# Patient Record
Sex: MALE | NOT HISPANIC OR LATINO | URBAN - METROPOLITAN AREA
[De-identification: names, ages, dates, MRNs, and addresses within clinical notes are randomized per-mention and may not be internally consistent; named-entity substitution may affect disease eponyms.]

---

## 2017-04-25 ENCOUNTER — IMPORTED ENCOUNTER (OUTPATIENT)
Dept: URBAN - METROPOLITAN AREA CLINIC 1 | Facility: CLINIC | Age: 62
End: 2017-04-25

## 2017-04-25 PROBLEM — H25.813: Noted: 2017-04-25

## 2017-04-25 PROCEDURE — 92015 DETERMINE REFRACTIVE STATE: CPT

## 2017-04-25 PROCEDURE — 92014 COMPRE OPH EXAM EST PT 1/>: CPT

## 2017-04-25 NOTE — PATIENT DISCUSSION
1.  Cataract OU:  Visually Significant secondary to glare OS Visually significant OD>OS. Will perform OU given degree of myopia and glare as well as to avoid anisometropic symptoms if OD alone were to be performed. Given OD is dominant eye will perform OS then OD. Discussed the risks benefits alternatives and limitations of cataract surgery. The patient stated a full understanding and a desire to proceed with the procedure. The patient will need to return for preop appointment with cataract measurements and to have any additional questions answered and start pre-operative eye drops as directed. Phaco PCL OS then OD. Not MF candidate due to Retinal hx. Plan monofocal lens. (Otherwise follow-up 6 mo 10 glare) 2. 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 H/o Macular Hole Repair OD 4. Dry Eyes OU- Continue ATs TID OU routinely. 5.  H/o RD OD with H/o Laser Retinopexy OD (Dr. Luis Antonio Arredondo) Keep scheduled appointments with Dr. Luis Antonio Arredondo. Pt sees Dr. Luis Antonio Arredondo Q6 months. Letter to PCP Return for an appointment with Dr. Rose Marie Pearce for AS/HP.

## 2017-06-19 ENCOUNTER — IMPORTED ENCOUNTER (OUTPATIENT)
Dept: URBAN - METROPOLITAN AREA CLINIC 1 | Facility: CLINIC | Age: 62
End: 2017-06-19

## 2017-06-19 PROBLEM — H25.813: Noted: 2017-06-19

## 2017-06-19 PROCEDURE — 92136 OPHTHALMIC BIOMETRY: CPT

## 2017-06-19 NOTE — PATIENT DISCUSSION
1. Cataract OU:  Visually Significant discussed the risks benefits alternatives and limitations of cataract surgery. The patient stated a full understanding and a desire to proceed with the procedure. Pt understands they will need glasses post-op to achieve their best corrected vision. Phaco PCL OS then OD Toric standard technique.

## 2017-06-28 ENCOUNTER — IMPORTED ENCOUNTER (OUTPATIENT)
Dept: URBAN - METROPOLITAN AREA CLINIC 1 | Facility: CLINIC | Age: 62
End: 2017-06-28

## 2017-06-29 ENCOUNTER — IMPORTED ENCOUNTER (OUTPATIENT)
Dept: URBAN - METROPOLITAN AREA CLINIC 1 | Facility: CLINIC | Age: 62
End: 2017-06-29

## 2017-06-29 PROBLEM — Z96.1: Noted: 2017-06-29

## 2017-06-29 PROCEDURE — 99024 POSTOP FOLLOW-UP VISIT: CPT

## 2017-06-29 NOTE — PATIENT DISCUSSION
POD#1 CE/IOL OS (Toric)  doing well. Continue all 3 gtts as prescribed and until gone. Use Durezol BID OS Ilevro Qdaily OS Ocuflox TID OS Post op Warnings Reiterated RTC as scheduled

## 2017-07-05 ENCOUNTER — IMPORTED ENCOUNTER (OUTPATIENT)
Dept: URBAN - METROPOLITAN AREA CLINIC 1 | Facility: CLINIC | Age: 62
End: 2017-07-05

## 2017-07-05 PROBLEM — Z96.1: Noted: 2017-07-05

## 2017-07-05 PROBLEM — H25.811: Noted: 2017-07-05

## 2017-07-05 PROCEDURE — 92136 OPHTHALMIC BIOMETRY: CPT

## 2017-07-05 NOTE — PATIENT DISCUSSION
1. POW#1  CE/IOL OS doing well. Discontinue OcufloxContinue Durezol BID until gone. Continue Ilevro QD until gone. 2. Cataract OD: Visually Significant discussed the risks benefits alternatives and limitations of cataract surgery. The patient stated a full understanding and a desire to proceed with the procedure.   Phaco PCL/OD Tecnis Toric Lens

## 2017-07-12 ENCOUNTER — IMPORTED ENCOUNTER (OUTPATIENT)
Dept: URBAN - METROPOLITAN AREA CLINIC 1 | Facility: CLINIC | Age: 62
End: 2017-07-12

## 2017-07-13 ENCOUNTER — IMPORTED ENCOUNTER (OUTPATIENT)
Dept: URBAN - METROPOLITAN AREA CLINIC 1 | Facility: CLINIC | Age: 62
End: 2017-07-13

## 2017-07-13 PROBLEM — Z96.1: Noted: 2017-07-13

## 2017-07-13 PROCEDURE — 99024 POSTOP FOLLOW-UP VISIT: CPT

## 2017-07-13 NOTE — PATIENT DISCUSSION
1. POD#1 CE/IOL Tecnis Toric OD doing well. Continue all 3 gtts as prescribed and until gone. Ocuflox TIDDurezol BIDIlevro QDPost op Warnings Reiterated 2. POW#2  CE/IOL Tecnis Toric OS doing well. Continue Durezol BID until gone. Continue Ilevro QD until gone. 3.   RTC as scheduled

## 2017-08-08 ENCOUNTER — IMPORTED ENCOUNTER (OUTPATIENT)
Dept: URBAN - METROPOLITAN AREA CLINIC 1 | Facility: CLINIC | Age: 62
End: 2017-08-08

## 2017-08-08 PROBLEM — Z96.1: Noted: 2017-08-08

## 2017-08-08 PROCEDURE — 99024 POSTOP FOLLOW-UP VISIT: CPT

## 2017-08-08 NOTE — PATIENT DISCUSSION
POM#1 CE/IOL Torics OU doing well. Continue Lotemax/Durezol/Prednisolone BID until gone. Continue Prolensa/Ilevro/Acular QD until gone. Return for an appointment for a 27 in March with Dr. Rylan Blackburn.

## 2017-12-14 ENCOUNTER — IMPORTED ENCOUNTER (OUTPATIENT)
Dept: URBAN - METROPOLITAN AREA CLINIC 1 | Facility: CLINIC | Age: 62
End: 2017-12-14

## 2017-12-14 PROBLEM — Z96.1: Noted: 2017-12-14

## 2017-12-14 PROBLEM — H26.493: Noted: 2017-12-14

## 2017-12-14 PROBLEM — H16.143: Noted: 2017-12-14

## 2017-12-14 PROBLEM — H04.123: Noted: 2017-12-14

## 2017-12-14 PROCEDURE — 92012 INTRM OPH EXAM EST PATIENT: CPT

## 2017-12-14 NOTE — PATIENT DISCUSSION
1.  ZARINA w/ PEK OU- Previous Trauma OD. No signs of FB or Abrasion present OD on today's exam. Begin Lotemax TID OD (sample given use until gone). Recommend ATs QID OD BID OS then decrease BID OU routinely 2. PCO OU: (Posterior Capsule Opacification)   Observe and consider yag cap when pt feels pco visually significant and visual acuity decreases to appropriate level. 3. Pseudophakia OU - (Toric OU) 4. H/o CR Scars OU 5. H/o PVD OD 6. H/o Horseshoe Tear OS - s/p Retinopexy OS 7. H/o ERM OD 8. H/o RD OD with H/o Laser Retinopexy OD (Dr. Stewart Guard for an appointment for Return as scheduled with Dr. Sowmya Moon.

## 2018-03-08 ENCOUNTER — IMPORTED ENCOUNTER (OUTPATIENT)
Dept: URBAN - METROPOLITAN AREA CLINIC 1 | Facility: CLINIC | Age: 63
End: 2018-03-08

## 2018-03-08 PROBLEM — H26.493: Noted: 2018-03-08

## 2018-03-08 PROBLEM — Z96.1: Noted: 2018-03-08

## 2018-03-08 PROBLEM — H04.123: Noted: 2018-03-08

## 2018-03-08 PROBLEM — H35.373: Noted: 2018-03-08

## 2018-03-08 PROBLEM — H16.143: Noted: 2018-03-08

## 2018-03-08 PROCEDURE — 92014 COMPRE OPH EXAM EST PT 1/>: CPT

## 2018-03-08 NOTE — PATIENT DISCUSSION
1.  ZARINA w/ PEK OU- Cont ATs QID OU Routinely. 2.  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 Observe for change. 3. PCO OU: (Posterior Capsule Opacification)   Observe 4. Pseudophakia OU - (Toric OU) 5.  CR Scars OU 6. PVD OD 7. H/o Horseshoe Tear OS - s/p Retinopexy OS 8. H/o RD OD with H/o Laser Retinopexy OD (Dr. Michael Avendano to [address] for an appointment in 1 yr 30 glare with Dr. Sarah Woods.

## 2019-03-12 ENCOUNTER — IMPORTED ENCOUNTER (OUTPATIENT)
Dept: URBAN - METROPOLITAN AREA CLINIC 1 | Facility: CLINIC | Age: 64
End: 2019-03-12

## 2019-03-12 PROBLEM — H35.372: Noted: 2019-03-12

## 2019-03-12 PROBLEM — H26.492: Noted: 2019-03-12

## 2019-03-12 PROBLEM — H43.811: Noted: 2019-03-12

## 2019-03-12 PROBLEM — H04.123: Noted: 2019-03-12

## 2019-03-12 PROBLEM — H26.491: Noted: 2019-03-12

## 2019-03-12 PROCEDURE — 92015 DETERMINE REFRACTIVE STATE: CPT

## 2019-03-12 PROCEDURE — 92014 COMPRE OPH EXAM EST PT 1/>: CPT

## 2019-03-12 NOTE — PATIENT DISCUSSION
1.  PCO OD -- Visually Significant secondary to glare; Schedule YAG Cap OD. Pros cons risks and benefits. 2.  PCO OS (Posterior Capsule Opacification) -- Observe and consider yag cap when pt feels pco visually significant and visual acuity decreases to appropriate level. 3. Epiretinal Membrane OS -- H/o Membrane Peel OD. Observe for change. 4. Dry Eyes OU -- Cont ATs QID OU Routinely. . 5. PVD w/o Tear OD - RD precautions given. 6. Pseudophakia OU - (Toric OU) 7.  CR Scars OU 8. H/o Horseshoe Tear OS - s/p Retinopexy OS 9. H/o RD OD with H/o Laser Retinopexy OD (Dr. Blaise Candelaria for next available YAG Cap Laser OD with Dr. Renetta Carrera.

## 2019-04-19 ENCOUNTER — IMPORTED ENCOUNTER (OUTPATIENT)
Dept: URBAN - METROPOLITAN AREA CLINIC 1 | Facility: CLINIC | Age: 64
End: 2019-04-19

## 2019-04-19 PROBLEM — H26.491: Noted: 2019-04-19

## 2019-04-19 PROCEDURE — 66821 AFTER CATARACT LASER SURGERY: CPT

## 2019-06-03 ENCOUNTER — IMPORTED ENCOUNTER (OUTPATIENT)
Dept: URBAN - METROPOLITAN AREA CLINIC 1 | Facility: CLINIC | Age: 64
End: 2019-06-03

## 2019-06-03 PROBLEM — Z09: Noted: 2019-06-03

## 2019-06-03 PROCEDURE — 99024 POSTOP FOLLOW-UP VISIT: CPT

## 2019-06-03 NOTE — PATIENT DISCUSSION
PO YAG Cap OD: good result. MRX optional. Return for an appointment in March 30 with Dr. Sadiq Griffiths.

## 2022-04-02 ASSESSMENT — VISUAL ACUITY
OD_GLARE: 20/800
OS_CC: 20/20
OS_CC: 20/20
OS_GLARE: 20/40
OS_GLARE: 20/40
OS_CC: 20/20
OS_GLARE: 20/40
OD_CC: 20/20
OS_SC: 20/30
OD_CC: 20/20
OD_SC: 20/200
OD_CC: 20/20
OD_CC: 20/30-2
OD_SC: 20/200
OD_CC: 20/20
OD_CC: J2
OS_CC: 20/20
OD_CC: 20/25-2
OS_CC: 20/20
OS_CC: J2
OS_CC: 20/20
OS_CC: 20/20
OD_GLARE: 20/100
OS_SC: 20/30
OS_CC: 20/20
OS_CC: J2
OD_CC: J2
OD_SC: 20/200

## 2022-04-02 ASSESSMENT — TONOMETRY
OD_IOP_MMHG: 14
OD_IOP_MMHG: 16
OS_IOP_MMHG: 14
OS_IOP_MMHG: 15
OS_IOP_MMHG: 18
OD_IOP_MMHG: 19
OS_IOP_MMHG: 16
OD_IOP_MMHG: 18
OS_IOP_MMHG: 17
OS_IOP_MMHG: 16
OD_IOP_MMHG: 16
OS_IOP_MMHG: 18
OS_IOP_MMHG: 18
OD_IOP_MMHG: 17
OD_IOP_MMHG: 15
OS_IOP_MMHG: 19

## 2022-11-21 ENCOUNTER — NEW PATIENT (OUTPATIENT)
Dept: URBAN - METROPOLITAN AREA CLINIC 1 | Facility: CLINIC | Age: 67
End: 2022-11-21

## 2022-11-21 DIAGNOSIS — H43.811: ICD-10-CM

## 2022-11-21 DIAGNOSIS — H04.123: ICD-10-CM

## 2022-11-21 DIAGNOSIS — H26.492: ICD-10-CM

## 2022-11-21 DIAGNOSIS — H35.372: ICD-10-CM

## 2022-11-21 PROCEDURE — 92004 COMPRE OPH EXAM NEW PT 1/>: CPT

## 2022-11-21 ASSESSMENT — VISUAL ACUITY
OD_CC: J1
OD_SC: 20/25+2
OU_CC: J1+
OD_SC: J2
OS_SC: 20/20-2
OS_CC: J1+
OS_SC: J2

## 2022-11-21 ASSESSMENT — TONOMETRY
OD_IOP_MMHG: 18
OS_IOP_MMHG: 17